# Patient Record
Sex: FEMALE | Race: WHITE
[De-identification: names, ages, dates, MRNs, and addresses within clinical notes are randomized per-mention and may not be internally consistent; named-entity substitution may affect disease eponyms.]

---

## 2018-05-31 NOTE — DIAGNOSTIC IMAGING REPORT
EXAM:  DXA BONE DENSITY

INDICATIONS: Postmenopausal screening 

COMPARISON: None.

 

FINDINGS:

Left femoral neck bone mineral density (BMD) (g/cm2):0.734

Femur T-score (standard deviation relative to young adult mean BMD):-1

Femur Z-score (standard deviation relative to age-matched control 

group):0.3

 

Lumbar bone mineral density (BMD) (g/cm2):0.965

Lumbar T-score (standard deviation relative to young adult mean BMD):

-0.7

Lumbar Z-score (standard deviation relative to age-matched control 

group):0.8

 

CONCLUSION:

WHO bone mineral classification: Normal

 

 

 

 

Dictated by:  Edin Johnson M.D. on 5/31/2018 at 13:14     

Electronically approved by:  Edin Johnson M.D. on 5/31/2018 at 

13:14

## 2021-06-16 ENCOUNTER — HOSPITAL ENCOUNTER (OUTPATIENT)
Dept: HOSPITAL 88 - OR | Age: 66
Discharge: HOME | End: 2021-06-16
Attending: INTERNAL MEDICINE
Payer: COMMERCIAL

## 2021-06-16 VITALS — DIASTOLIC BLOOD PRESSURE: 68 MMHG | SYSTOLIC BLOOD PRESSURE: 114 MMHG

## 2021-06-16 DIAGNOSIS — Z71.3: ICD-10-CM

## 2021-06-16 DIAGNOSIS — E66.9: ICD-10-CM

## 2021-06-16 DIAGNOSIS — M06.9: ICD-10-CM

## 2021-06-16 DIAGNOSIS — M19.90: ICD-10-CM

## 2021-06-16 DIAGNOSIS — K44.9: ICD-10-CM

## 2021-06-16 DIAGNOSIS — G40.909: ICD-10-CM

## 2021-06-16 DIAGNOSIS — K21.00: ICD-10-CM

## 2021-06-16 DIAGNOSIS — Z86.010: ICD-10-CM

## 2021-06-16 DIAGNOSIS — K22.70: ICD-10-CM

## 2021-06-16 DIAGNOSIS — K29.00: Primary | ICD-10-CM

## 2021-06-16 PROCEDURE — 43239 EGD BIOPSY SINGLE/MULTIPLE: CPT

## 2021-06-16 PROCEDURE — 88305 TISSUE EXAM BY PATHOLOGIST: CPT

## 2021-06-16 PROCEDURE — 93005 ELECTROCARDIOGRAM TRACING: CPT

## 2021-06-16 PROCEDURE — 88312 SPECIAL STAINS GROUP 1: CPT
